# Patient Record
Sex: MALE | URBAN - METROPOLITAN AREA
[De-identification: names, ages, dates, MRNs, and addresses within clinical notes are randomized per-mention and may not be internally consistent; named-entity substitution may affect disease eponyms.]

---

## 2022-06-27 ENCOUNTER — OFFICE VISIT (OUTPATIENT)
Dept: URBAN - METROPOLITAN AREA CLINIC 72 | Facility: CLINIC | Age: 52
End: 2022-06-27
Payer: MEDICAID

## 2022-06-27 DIAGNOSIS — H49.12 4TH NERVE PALSY OF LEFT EYE: Primary | ICD-10-CM

## 2022-06-27 DIAGNOSIS — H53.2 DIPLOPIA: ICD-10-CM

## 2022-06-27 DIAGNOSIS — H52.4 PRESBYOPIA: ICD-10-CM

## 2022-06-27 PROCEDURE — 92002 INTRM OPH EXAM NEW PATIENT: CPT | Performed by: OPTOMETRIST

## 2022-06-27 ASSESSMENT — VISUAL ACUITY
OD: 20/20
OS: 20/20

## 2022-06-27 ASSESSMENT — INTRAOCULAR PRESSURE
OS: 9
OD: 11

## 2022-06-27 NOTE — IMPRESSION/PLAN
Impression: Diplopia: H53.2. Plan: discussed adding prism to help with double. Pt is still seeing small amount diagonally, but double does improve with prism. Explained that double may still resolve, but usually resolves around the 6-9 month callie and pt is at about 6-7 months.  
Pt understands and wishes to proceed with prism

## 2022-06-27 NOTE — IMPRESSION/PLAN
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

possible traumatic 4th nerve palsy Plan: Discussed DX in detail with pt Explained that palsy was more than likely caused from pt accident. It can resolve on its own, but usually is resolved by the 6-9 month callie, pt is at the 6-7 month callie. Can continue to monitor for another few months or give pt new MRX with prism Pt voices understanding and wishes to proceed with glasses

## 2024-01-02 ENCOUNTER — OFFICE VISIT (OUTPATIENT)
Dept: URBAN - METROPOLITAN AREA CLINIC 72 | Facility: CLINIC | Age: 54
End: 2024-01-02
Payer: MEDICAID

## 2024-01-02 DIAGNOSIS — H49.12 4TH NERVE PALSY OF LEFT EYE: Primary | ICD-10-CM

## 2024-01-02 DIAGNOSIS — H53.2 DIPLOPIA: ICD-10-CM

## 2024-01-02 PROCEDURE — 92014 COMPRE OPH EXAM EST PT 1/>: CPT | Performed by: OPTOMETRIST

## 2024-01-02 ASSESSMENT — VISUAL ACUITY
OS: 20/20
OD: 20/20

## 2024-01-02 ASSESSMENT — INTRAOCULAR PRESSURE
OS: 17
OD: 18

## 2024-06-17 ENCOUNTER — OFFICE VISIT (OUTPATIENT)
Dept: URBAN - METROPOLITAN AREA CLINIC 72 | Facility: CLINIC | Age: 54
End: 2024-06-17
Payer: MEDICAID

## 2024-06-17 DIAGNOSIS — H49.12 4TH NERVE PALSY OF LEFT EYE: ICD-10-CM

## 2024-06-17 DIAGNOSIS — H25.813 COMBINED FORMS OF AGE-RELATED CATARACT, BILATERAL: ICD-10-CM

## 2024-06-17 DIAGNOSIS — H53.2 DIPLOPIA: Primary | ICD-10-CM

## 2024-06-17 DIAGNOSIS — H52.4 PRESBYOPIA: ICD-10-CM

## 2024-06-17 PROCEDURE — 92014 COMPRE OPH EXAM EST PT 1/>: CPT | Performed by: OPTOMETRIST

## 2024-06-17 ASSESSMENT — VISUAL ACUITY
OD: 20/20
OS: 20/20

## 2025-06-24 ENCOUNTER — OFFICE VISIT (OUTPATIENT)
Dept: URBAN - METROPOLITAN AREA CLINIC 71 | Facility: CLINIC | Age: 55
End: 2025-06-24
Payer: COMMERCIAL

## 2025-06-24 DIAGNOSIS — H25.13 AGE-RELATED NUCLEAR CATARACT, BILATERAL: ICD-10-CM

## 2025-06-24 DIAGNOSIS — E11.9 TYPE 2 DIABETES MELLITUS W/O COMPLICATION: ICD-10-CM

## 2025-06-24 DIAGNOSIS — H43.812 VITREOUS DEGENERATION, LEFT EYE: ICD-10-CM

## 2025-06-24 DIAGNOSIS — H49.12 4TH NERVE PALSY OF LEFT EYE: Primary | ICD-10-CM

## 2025-06-24 PROCEDURE — 99213 OFFICE O/P EST LOW 20 MIN: CPT

## 2025-06-24 ASSESSMENT — INTRAOCULAR PRESSURE
OD: 15
OS: 17